# Patient Record
Sex: MALE | Race: BLACK OR AFRICAN AMERICAN
[De-identification: names, ages, dates, MRNs, and addresses within clinical notes are randomized per-mention and may not be internally consistent; named-entity substitution may affect disease eponyms.]

---

## 2021-07-22 PROBLEM — Z00.00 ENCOUNTER FOR PREVENTIVE HEALTH EXAMINATION: Status: ACTIVE | Noted: 2021-07-22

## 2021-07-30 ENCOUNTER — APPOINTMENT (OUTPATIENT)
Dept: NEUROSURGERY | Facility: CLINIC | Age: 56
End: 2021-07-30
Payer: MEDICARE

## 2021-07-30 VITALS
DIASTOLIC BLOOD PRESSURE: 74 MMHG | HEIGHT: 69 IN | HEART RATE: 75 BPM | BODY MASS INDEX: 27.55 KG/M2 | WEIGHT: 186 LBS | SYSTOLIC BLOOD PRESSURE: 130 MMHG

## 2021-07-30 DIAGNOSIS — M54.16 RADICULOPATHY, LUMBAR REGION: ICD-10-CM

## 2021-07-30 PROCEDURE — 99203 OFFICE O/P NEW LOW 30 MIN: CPT

## 2021-07-30 RX ORDER — METHOCARBAMOL 750 MG/1
TABLET, FILM COATED ORAL
Refills: 0 | Status: ACTIVE | COMMUNITY

## 2021-07-30 RX ORDER — METHOCARBAMOL 750 MG/1
750 TABLET, FILM COATED ORAL 3 TIMES DAILY
Qty: 90 | Refills: 2 | Status: ACTIVE | COMMUNITY
Start: 2021-07-30 | End: 1900-01-01

## 2021-07-30 RX ORDER — NAPROXEN 500 MG/1
TABLET, DELAYED RELEASE ORAL
Refills: 0 | Status: ACTIVE | COMMUNITY

## 2021-07-30 RX ORDER — CELECOXIB 200 MG/1
200 CAPSULE ORAL DAILY
Qty: 30 | Refills: 1 | Status: ACTIVE | COMMUNITY
Start: 2021-07-30 | End: 1900-01-01

## 2021-08-17 ENCOUNTER — APPOINTMENT (OUTPATIENT)
Dept: NEUROSURGERY | Facility: CLINIC | Age: 56
End: 2021-08-17
Payer: MEDICARE

## 2021-08-17 VITALS
TEMPERATURE: 96.5 F | SYSTOLIC BLOOD PRESSURE: 160 MMHG | DIASTOLIC BLOOD PRESSURE: 70 MMHG | BODY MASS INDEX: 27.55 KG/M2 | HEIGHT: 69 IN | HEART RATE: 116 BPM | WEIGHT: 186 LBS

## 2021-08-17 DIAGNOSIS — M48.062 SPINAL STENOSIS, LUMBAR REGION WITH NEUROGENIC CLAUDICATION: ICD-10-CM

## 2021-08-17 DIAGNOSIS — M71.38 OTHER BURSAL CYST, OTHER SITE: ICD-10-CM

## 2021-08-17 PROCEDURE — 99213 OFFICE O/P EST LOW 20 MIN: CPT

## 2021-08-17 NOTE — REASON FOR VISIT
[Follow-Up: _____] : a [unfilled] follow-up visit [FreeTextEntry1] : \par Jani is here for an imaging review.  He still having severe back and leg pain on the left.  He had an image done at [facility] radiology.\par \par [facility]   [place]\par

## 2021-08-17 NOTE — REASON FOR VISIT
[New Patient Visit] : a new patient visit [FreeTextEntry1] : lower back pain- l-leg pain with tingling and numbness with severe weakness.

## 2021-08-17 NOTE — ASSESSMENT
[FreeTextEntry1] : DIAGNOSIS:    LUMBAR  STENOSIS/DISK DEGENERATION/SYNOVIAL CYST  WITH RADICULOPATHY L45 LEFT\par TREATMENT PLAN:  NON-SURGICAL  VS. LUMBAR DECOMPRESSION WITH INSTRUMENTED STABILIZATION AND FUSION   L4-5 \par \par This is a patient with degenerated lumbar disks with associated stenosis and spondylosis.  I have recommended nonsurgical management at this time.  This consists of physical therapy and/or manual medicine in conjunction to medical therapy and other conservative methods.  These include the consideration of trigger point injections and or the utilization of modalities such as TENS where applicable.  The next tier would be the referral to a pain management specialist (anesthesia or physiatry) for the consideration of spinal injections.  This includes the options of epidural steroids, facet injections as well as other novel techniques that may provide pain relief.  \par \par If all nonsurgical methods fail or there is neurological issue of concern, I have recommended lumbar decompression as a treatment option.  This entails removing the lamina and the medial facet joints along with the underlying hypertrophied ligamentum flavum.  This will allow for a widening of the spinal canal and the neuroforamen at the effected levels.  In doing so will likely result in added instability to the spine at this level requiring the need for instrumented stabilization and fusion.  This implies the use of pedicle screws and possibly interbody prosthetics to provide strength and anatomical alignment to the operated segments.  \par \par Risks and benefits of surgery were described to the patient in detail which include but not excluding those otherwise not mentioned:  coma paralysis, death, stroke, spinal fluid leak, nerve injury, weakness, infection, hardware malfunction/failure/mal-positioning requiring re-operation, vascular injury, nonunion/pseudoarthrosis, adjacent segment degeneration, dysphonia/dysphagia and persistent pain.\par \par I have reviewed the images in detail with the patient today in my office and have answered all questions regarding this condition to the best of my ability to the patient’s satisfaction.

## 2021-08-17 NOTE — ASSESSMENT
[FreeTextEntry1] : This is a 56-year-old male with 2 months of left leg pain and left foot weakness.  During that physical exam finding of left foot weakness I would like to bypass further conservative measures and obtain a new MRI lumbar spine without contrast.  I will continue with anti-inflammatories muscle relaxers they have provided some temporary relief\par \par I will have him follow-up with Dr. Calderón after the MRI is complete

## 2021-08-17 NOTE — HISTORY OF PRESENT ILLNESS
[FreeTextEntry1] : Left leg pain [de-identified] : This is a 56-year-old male with 2 months of left leg pain that is worsening.  Scribes weakness in his left foot.  He is fallen several times due to this.  He has numbness tingling in his left leg.  He describes the pain is radiating down his left lateral thigh into his lateral calf into the bottom of his foot.  Pain is severe constant and denies any relieving or aggravating factors.  Patient reports difficulty walking and gait problems.  He is try taking methocarbamol and naproxen without relief.  He has not had any physical therapy or pain management due to the severity of symptoms.  He has been trying to do exercises at home which were given to him by his physician which have only made his symptoms worse

## 2021-08-17 NOTE — RESULTS/DATA
[FreeTextEntry1] : [facility] radiology shows a large synovial cyst on the left and the rather severe stenosis at L4-5.  There is significant facet arthropathy.